# Patient Record
Sex: MALE | Race: WHITE | Employment: OTHER | URBAN - METROPOLITAN AREA
[De-identification: names, ages, dates, MRNs, and addresses within clinical notes are randomized per-mention and may not be internally consistent; named-entity substitution may affect disease eponyms.]

---

## 2019-04-15 ENCOUNTER — HOSPITAL ENCOUNTER (OUTPATIENT)
Dept: PREADMISSION TESTING | Age: 79
Discharge: HOME | End: 2019-04-15
Payer: MEDICARE

## 2019-04-15 VITALS — HEIGHT: 69 IN | WEIGHT: 158 LBS | BODY MASS INDEX: 23.4 KG/M2

## 2019-04-15 LAB
ANION GAP SERPL CALC-SCNC: 8 MMOL/L (ref 3–18)
ATRIAL RATE: 57 BPM
BUN SERPL-MCNC: 23 MG/DL (ref 7–18)
BUN/CREAT SERPL: 19 (ref 12–20)
CALCIUM SERPL-MCNC: 9.5 MG/DL (ref 8.5–10.1)
CALCULATED P AXIS, ECG09: 77 DEGREES
CALCULATED R AXIS, ECG10: 64 DEGREES
CALCULATED T AXIS, ECG11: 53 DEGREES
CHLORIDE SERPL-SCNC: 105 MMOL/L (ref 100–108)
CO2 SERPL-SCNC: 29 MMOL/L (ref 21–32)
CREAT SERPL-MCNC: 1.2 MG/DL (ref 0.6–1.3)
DIAGNOSIS, 93000: NORMAL
ERYTHROCYTE [DISTWIDTH] IN BLOOD BY AUTOMATED COUNT: 13.5 % (ref 11.6–14.5)
GLUCOSE SERPL-MCNC: 80 MG/DL (ref 74–99)
HCT VFR BLD AUTO: 42.2 % (ref 36–48)
HGB BLD-MCNC: 13.7 G/DL (ref 13–16)
MCH RBC QN AUTO: 28.8 PG (ref 24–34)
MCHC RBC AUTO-ENTMCNC: 32.5 G/DL (ref 31–37)
MCV RBC AUTO: 88.7 FL (ref 74–97)
P-R INTERVAL, ECG05: 138 MS
PLATELET # BLD AUTO: 122 K/UL (ref 135–420)
PMV BLD AUTO: 10.3 FL (ref 9.2–11.8)
POTASSIUM SERPL-SCNC: 3.9 MMOL/L (ref 3.5–5.5)
Q-T INTERVAL, ECG07: 450 MS
QRS DURATION, ECG06: 92 MS
QTC CALCULATION (BEZET), ECG08: 438 MS
RBC # BLD AUTO: 4.76 M/UL (ref 4.7–5.5)
SODIUM SERPL-SCNC: 142 MMOL/L (ref 136–145)
VENTRICULAR RATE, ECG03: 57 BPM
WBC # BLD AUTO: 7.9 K/UL (ref 4.6–13.2)

## 2019-04-15 PROCEDURE — 85027 COMPLETE CBC AUTOMATED: CPT

## 2019-04-15 PROCEDURE — 93005 ELECTROCARDIOGRAM TRACING: CPT

## 2019-04-15 PROCEDURE — 80048 BASIC METABOLIC PNL TOTAL CA: CPT

## 2019-04-15 RX ORDER — RAMIPRIL 10 MG/1
10 CAPSULE ORAL DAILY
COMMUNITY

## 2019-04-15 RX ORDER — SODIUM CHLORIDE, SODIUM LACTATE, POTASSIUM CHLORIDE, CALCIUM CHLORIDE 600; 310; 30; 20 MG/100ML; MG/100ML; MG/100ML; MG/100ML
125 INJECTION, SOLUTION INTRAVENOUS CONTINUOUS
Status: CANCELLED | OUTPATIENT
Start: 2019-04-15

## 2019-04-15 RX ORDER — ATORVASTATIN CALCIUM 40 MG/1
40 TABLET, FILM COATED ORAL
COMMUNITY

## 2019-04-15 RX ORDER — METOPROLOL TARTRATE 100 MG/1
100 TABLET ORAL 2 TIMES DAILY
COMMUNITY

## 2019-04-15 RX ORDER — CEFAZOLIN SODIUM 2 G/50ML
2 SOLUTION INTRAVENOUS ONCE
Status: CANCELLED | OUTPATIENT
Start: 2019-04-15 | End: 2019-04-15

## 2019-05-03 ENCOUNTER — HOSPITAL ENCOUNTER (OUTPATIENT)
Age: 79
Setting detail: OUTPATIENT SURGERY
Discharge: HOME | End: 2019-05-03
Attending: UROLOGY | Admitting: UROLOGY
Payer: MEDICARE

## 2019-05-03 ENCOUNTER — ANESTHESIA (OUTPATIENT)
Dept: SURGERY | Age: 79
End: 2019-05-03
Payer: MEDICARE

## 2019-05-03 ENCOUNTER — ANESTHESIA EVENT (OUTPATIENT)
Dept: SURGERY | Age: 79
End: 2019-05-03
Payer: MEDICARE

## 2019-05-03 VITALS
SYSTOLIC BLOOD PRESSURE: 144 MMHG | HEART RATE: 56 BPM | RESPIRATION RATE: 14 BRPM | TEMPERATURE: 97.4 F | DIASTOLIC BLOOD PRESSURE: 50 MMHG | HEIGHT: 69 IN | OXYGEN SATURATION: 100 % | BODY MASS INDEX: 23.26 KG/M2 | WEIGHT: 157.06 LBS

## 2019-05-03 DIAGNOSIS — N47.1 ACQUIRED PHIMOSIS: Primary | ICD-10-CM

## 2019-05-03 PROCEDURE — 76210000022 HC REC RM PH II 1.5 TO 2 HR: Performed by: UROLOGY

## 2019-05-03 PROCEDURE — 76210000063 HC OR PH I REC FIRST 0.5 HR: Performed by: UROLOGY

## 2019-05-03 PROCEDURE — 74011250636 HC RX REV CODE- 250/636

## 2019-05-03 PROCEDURE — 74011250636 HC RX REV CODE- 250/636: Performed by: UROLOGY

## 2019-05-03 PROCEDURE — 76010000149 HC OR TIME 1 TO 1.5 HR: Performed by: UROLOGY

## 2019-05-03 PROCEDURE — 77030020782 HC GWN BAIR PAWS FLX 3M -B: Performed by: UROLOGY

## 2019-05-03 PROCEDURE — 77030032490 HC SLV COMPR SCD KNE COVD -B: Performed by: UROLOGY

## 2019-05-03 PROCEDURE — 74011000250 HC RX REV CODE- 250: Performed by: UROLOGY

## 2019-05-03 PROCEDURE — 76060000033 HC ANESTHESIA 1 TO 1.5 HR: Performed by: UROLOGY

## 2019-05-03 PROCEDURE — 77030012508 HC MSK AIRWY LMA AMBU -A: Performed by: ANESTHESIOLOGY

## 2019-05-03 PROCEDURE — 77030018836 HC SOL IRR NACL ICUM -A: Performed by: UROLOGY

## 2019-05-03 PROCEDURE — 74011000250 HC RX REV CODE- 250

## 2019-05-03 PROCEDURE — 74011000272 HC RX REV CODE- 272: Performed by: UROLOGY

## 2019-05-03 PROCEDURE — 77030031139 HC SUT VCRL2 J&J -A: Performed by: UROLOGY

## 2019-05-03 RX ORDER — EPHEDRINE SULFATE 50 MG/ML
INJECTION, SOLUTION INTRAVENOUS AS NEEDED
Status: DISCONTINUED | OUTPATIENT
Start: 2019-05-03 | End: 2019-05-03 | Stop reason: HOSPADM

## 2019-05-03 RX ORDER — HYDROCODONE BITARTRATE AND ACETAMINOPHEN 5; 325 MG/1; MG/1
1 TABLET ORAL
Qty: 15 TAB | Refills: 0 | Status: SHIPPED | OUTPATIENT
Start: 2019-05-03 | End: 2019-05-10

## 2019-05-03 RX ORDER — ONDANSETRON 2 MG/ML
INJECTION INTRAMUSCULAR; INTRAVENOUS AS NEEDED
Status: DISCONTINUED | OUTPATIENT
Start: 2019-05-03 | End: 2019-05-03 | Stop reason: HOSPADM

## 2019-05-03 RX ORDER — FLUMAZENIL 0.1 MG/ML
0.2 INJECTION INTRAVENOUS
Status: DISCONTINUED | OUTPATIENT
Start: 2019-05-03 | End: 2019-05-03 | Stop reason: HOSPADM

## 2019-05-03 RX ORDER — MIDAZOLAM HYDROCHLORIDE 1 MG/ML
INJECTION, SOLUTION INTRAMUSCULAR; INTRAVENOUS AS NEEDED
Status: DISCONTINUED | OUTPATIENT
Start: 2019-05-03 | End: 2019-05-03 | Stop reason: HOSPADM

## 2019-05-03 RX ORDER — PROPOFOL 10 MG/ML
INJECTION, EMULSION INTRAVENOUS AS NEEDED
Status: DISCONTINUED | OUTPATIENT
Start: 2019-05-03 | End: 2019-05-03 | Stop reason: HOSPADM

## 2019-05-03 RX ORDER — BACITRACIN 500 [USP'U]/G
OINTMENT TOPICAL AS NEEDED
Status: DISCONTINUED | OUTPATIENT
Start: 2019-05-03 | End: 2019-05-03 | Stop reason: HOSPADM

## 2019-05-03 RX ORDER — SODIUM CHLORIDE, SODIUM LACTATE, POTASSIUM CHLORIDE, CALCIUM CHLORIDE 600; 310; 30; 20 MG/100ML; MG/100ML; MG/100ML; MG/100ML
100 INJECTION, SOLUTION INTRAVENOUS CONTINUOUS
Status: DISCONTINUED | OUTPATIENT
Start: 2019-05-03 | End: 2019-05-03 | Stop reason: HOSPADM

## 2019-05-03 RX ORDER — CEFAZOLIN SODIUM 2 G/50ML
2 SOLUTION INTRAVENOUS ONCE
Status: COMPLETED | OUTPATIENT
Start: 2019-05-03 | End: 2019-05-03

## 2019-05-03 RX ORDER — CLOTRIMAZOLE AND BETAMETHASONE DIPROPIONATE 10; .5 MG/ML; MG/ML
LOTION TOPICAL 2 TIMES DAILY
Qty: 30 ML | Refills: 0 | Status: SHIPPED | OUTPATIENT
Start: 2019-05-03

## 2019-05-03 RX ORDER — LIDOCAINE HYDROCHLORIDE 20 MG/ML
INJECTION, SOLUTION EPIDURAL; INFILTRATION; INTRACAUDAL; PERINEURAL AS NEEDED
Status: DISCONTINUED | OUTPATIENT
Start: 2019-05-03 | End: 2019-05-03 | Stop reason: HOSPADM

## 2019-05-03 RX ORDER — SODIUM CHLORIDE, SODIUM LACTATE, POTASSIUM CHLORIDE, CALCIUM CHLORIDE 600; 310; 30; 20 MG/100ML; MG/100ML; MG/100ML; MG/100ML
125 INJECTION, SOLUTION INTRAVENOUS CONTINUOUS
Status: DISCONTINUED | OUTPATIENT
Start: 2019-05-03 | End: 2019-05-03 | Stop reason: HOSPADM

## 2019-05-03 RX ORDER — NALOXONE HYDROCHLORIDE 0.4 MG/ML
0.4 INJECTION, SOLUTION INTRAMUSCULAR; INTRAVENOUS; SUBCUTANEOUS AS NEEDED
Status: DISCONTINUED | OUTPATIENT
Start: 2019-05-03 | End: 2019-05-03 | Stop reason: HOSPADM

## 2019-05-03 RX ORDER — FENTANYL CITRATE 50 UG/ML
INJECTION, SOLUTION INTRAMUSCULAR; INTRAVENOUS AS NEEDED
Status: DISCONTINUED | OUTPATIENT
Start: 2019-05-03 | End: 2019-05-03 | Stop reason: HOSPADM

## 2019-05-03 RX ORDER — ONDANSETRON 2 MG/ML
4 INJECTION INTRAMUSCULAR; INTRAVENOUS ONCE
Status: DISCONTINUED | OUTPATIENT
Start: 2019-05-03 | End: 2019-05-03 | Stop reason: HOSPADM

## 2019-05-03 RX ORDER — FENTANYL CITRATE 50 UG/ML
50 INJECTION, SOLUTION INTRAMUSCULAR; INTRAVENOUS
Status: DISCONTINUED | OUTPATIENT
Start: 2019-05-03 | End: 2019-05-03 | Stop reason: HOSPADM

## 2019-05-03 RX ORDER — HYDROCODONE BITARTRATE AND ACETAMINOPHEN 5; 325 MG/1; MG/1
1 TABLET ORAL AS NEEDED
Status: DISCONTINUED | OUTPATIENT
Start: 2019-05-03 | End: 2019-05-03 | Stop reason: HOSPADM

## 2019-05-03 RX ADMIN — LIDOCAINE HYDROCHLORIDE 80 MG: 20 INJECTION, SOLUTION EPIDURAL; INFILTRATION; INTRACAUDAL; PERINEURAL at 09:54

## 2019-05-03 RX ADMIN — FENTANYL CITRATE 25 MCG: 50 INJECTION, SOLUTION INTRAMUSCULAR; INTRAVENOUS at 10:04

## 2019-05-03 RX ADMIN — SODIUM CHLORIDE, SODIUM LACTATE, POTASSIUM CHLORIDE, AND CALCIUM CHLORIDE: 600; 310; 30; 20 INJECTION, SOLUTION INTRAVENOUS at 10:27

## 2019-05-03 RX ADMIN — FENTANYL CITRATE 25 MCG: 50 INJECTION, SOLUTION INTRAMUSCULAR; INTRAVENOUS at 10:12

## 2019-05-03 RX ADMIN — MIDAZOLAM HYDROCHLORIDE 2 MG: 1 INJECTION, SOLUTION INTRAMUSCULAR; INTRAVENOUS at 09:47

## 2019-05-03 RX ADMIN — PROPOFOL 160 MG: 10 INJECTION, EMULSION INTRAVENOUS at 09:54

## 2019-05-03 RX ADMIN — FENTANYL CITRATE 25 MCG: 50 INJECTION, SOLUTION INTRAMUSCULAR; INTRAVENOUS at 10:08

## 2019-05-03 RX ADMIN — EPHEDRINE SULFATE 10 MG: 50 INJECTION, SOLUTION INTRAVENOUS at 10:20

## 2019-05-03 RX ADMIN — ONDANSETRON 4 MG: 2 INJECTION INTRAMUSCULAR; INTRAVENOUS at 10:10

## 2019-05-03 RX ADMIN — FENTANYL CITRATE 25 MCG: 50 INJECTION, SOLUTION INTRAMUSCULAR; INTRAVENOUS at 10:00

## 2019-05-03 RX ADMIN — SODIUM CHLORIDE, SODIUM LACTATE, POTASSIUM CHLORIDE, AND CALCIUM CHLORIDE 125 ML/HR: 600; 310; 30; 20 INJECTION, SOLUTION INTRAVENOUS at 12:48

## 2019-05-03 RX ADMIN — CEFAZOLIN SODIUM 2 G: 2 SOLUTION INTRAVENOUS at 09:58

## 2019-05-03 RX ADMIN — SODIUM CHLORIDE, SODIUM LACTATE, POTASSIUM CHLORIDE, AND CALCIUM CHLORIDE 125 ML/HR: 600; 310; 30; 20 INJECTION, SOLUTION INTRAVENOUS at 08:16

## 2019-05-03 NOTE — BRIEF OP NOTE
Date of Procedure: 5/3/2019 Preoperative Diagnosis: PHIMOSIS Postoperative Diagnosis: PHIMOSIS Procedure(s): CIRCUMCISION, \"SPEC POP\" Surgeon(s) and Role: 
   * Mandeep Holt MD - Primary Surgical Assistant: Caroline Santos Surgical Staff: 
Circ-1: Aquiles Nieto Circ-Relief: Preet Wallace RN Scrub Tech-1: Leo Key Scrub Tech-2: Ba Suresh Float Staff: Caroline Santos Event Time In Time Out Incision Start [id number] Incision Close 1041 Anesthesia: General  
Estimated Blood Loss: minimal 
Specimens: * No specimens in log * Findings: very adherent phimosis with near obliteration of the corona sulcus. Complications: None Implants: * No implants in log * PROCEDURE NOTE: 
Patient was wheeled into the operating theatre. Time-out was performed. Anesthesia was then administered without incident. Preoperative IV antibiotics were administered. Dorsal penile block was performed with 0.5% marcaine. Significant phimosis unable to retract the foreskin. The open at the foreskin was crushed with hemostat and cut with mets. The glans was then delivered out with findings as noted above. The foreskin was then marked proximally (at level of coronal sulcus) and distally (at mucosal skirt) and a #15-blade was then used to make a circumferential incision. A curved hemostat was used to dissect a passage deep to the skin to allow us to transect the skin from prox to distal with electrocautery. This creates a flap with 2 edges. Then 4 snaps are fastened, one to each edge. Then the skin was sharply dissected off of the underlying tissue using Bovie cautery. Attention was then turned to any bleeding with excellent hemostasis achieved using the Bovie. Using 3-0 vicryl, a U-stitch at the frenulum was used to align the skin edges. The dorsal edge was approximated with 3-0 vicryl. The circumcision line was approximated with 3-0 running suture in a total of 2 quadrants. This completed the procedure. Bacitracin was used as a dressing. The patient was then reversed from anesthesia and transferred to the PACU in stable condition.

## 2019-05-03 NOTE — DISCHARGE INSTRUCTIONS
[address] [facility], Urology     [facility], [address], [address]  Office: [phone]  Fax:    [phone]    PROCEDURE     Circumcision  __  [facility]rology IMMEDIATELY if any of the following occur:      Temperature above 101.5° and / or chills.   You are nauseous and / or vomiting and you cannot hold down any fluids.   Your pain is not controlled with the pain medication prescribed. Special Considerations:        _x_  No heavy lifting over 10 pounds & no strenuous exercise for 1 week.  _x_  Other instructions:  Ok to shower in 24 hours but no soaking in bath tube or swimming for 4 weeks to allow wound to heal. Apply the betamethasone cream twice a day for 2 weeks to the head of the penis.  _x_  Our office will call you to make an appointment in 3-4 weeks. Please contact [place] [address] [facility] at [phone] or go to the nearest Emergency Department / Urgent Care facility for any other medical questions or concerns. Patient armband removed and shredded  DISCHARGE SUMMARY from Nurse    PATIENT INSTRUCTIONS:    After general anesthesia or intravenous sedation, for 24 hours or while taking prescription Narcotics:  · Limit your activities  · Do not drive and operate hazardous machinery  · Do not make important personal or business decisions  · Do  not drink alcoholic beverages  · If you have not urinated within 8 hours after discharge, please contact your surgeon on call.     Report the following to your surgeon:  · Excessive pain, swelling, redness or odor of or around the surgical area  · Temperature over 100.5  · Nausea and vomiting lasting longer than 4 hours or if unable to take medications  · Any signs of decreased circulation or nerve impairment to extremity: change in color, persistent  numbness, tingling, coldness or increase pain  · Any questions    What to do at Home:  Recommended activity: Activity as tolerated, Activity as tolerated and no driving for today and Ambulate in house,     If you experience any of the following symptoms elevated temp, pain not relieved by mediations prescribed for you, unable to urinate, please follow up with Dr. Jose Sherman. *  Please give a list of your current medications to your Primary Care Provider. *  Please update this list whenever your medications are discontinued, doses are      changed, or new medications (including over-the-counter products) are added. *  Please carry medication information at all times in case of emergency situations. These are general instructions for a healthy lifestyle:    No smoking/ No tobacco products/ Avoid exposure to second hand smoke  Surgeon General's Warning:  Quitting smoking now greatly reduces serious risk to your health. Obesity, smoking, and sedentary lifestyle greatly increases your risk for illness    A healthy diet, regular physical exercise & weight monitoring are important for maintaining a healthy lifestyle    You may be retaining fluid if you have a history of heart failure or if you experience any of the following symptoms:  Weight gain of 3 pounds or more overnight or 5 pounds in a week, increased swelling in our hands or feet or shortness of breath while lying flat in bed. Please call your doctor as soon as you notice any of these symptoms; do not wait until your next office visit. Recognize signs and symptoms of STROKE:    F-face looks uneven    A-arms unable to move or move unevenly    S-speech slurred or non-existent    T-time-call 911 as soon as signs and symptoms begin-DO NOT go       Back to bed or wait to see if you get better-TIME IS BRAIN. Warning Signs of HEART ATTACK     Call 911 if you have these symptoms:   Chest discomfort. Most heart attacks involve discomfort in the center of the chest that lasts more than a few minutes, or that goes away and comes back.  It can feel like uncomfortable pressure, squeezing, fullness, or pain.  Discomfort in other areas of the upper body. Symptoms can include pain or discomfort in one or both arms, the back, neck, jaw, or stomach.  Shortness of breath with or without chest discomfort.  Other signs may include breaking out in a cold sweat, nausea, or lightheadedness. Don't wait more than five minutes to call 911 - MINUTES MATTER! Fast action can save your life. Calling 911 is almost always the fastest way to get lifesaving treatment. Emergency Medical Services staff can begin treatment when they arrive -- up to an hour sooner than if someone gets to the hospital by car. The discharge information has been reviewed with the patient and caregiver. The patient and caregiver verbalized understanding. Discharge medications reviewed with the {[employer] meds reviewed [id number]} and appropriate educational materials and side effects teaching were provided.   ___________________________________________________________________________________________________________________________________

## 2019-05-03 NOTE — ANESTHESIA POSTPROCEDURE EVALUATION
Post-Anesthesia Evaluation & Assessment Visit Vitals /49 Pulse 61 Temp 36.3 °C (97.4 °F) Resp 14 Ht 5' 9\" (1.753 m) Wt 71.2 kg (157 lb 1 oz) SpO2 99% BMI 23.19 kg/m² Nausea/Vomiting: Controlled. Post-operative hydration adequate. Pain Scale 1: Numeric (0 - 10) (05/03/19 1119) Pain Intensity 1: 0 (05/03/19 1119) Managed Pain score at or below stated goal level. Mental status & Level of consciousness: alert and oriented x 3 Neurological status: moves all extremities, sensation grossly intact Pulmonary status: airway patent, adequate oxygenation. Complications related to anesthesia: none Patient has met all PACU discharge requirements.  
 
 
Rosas Cortez DO

## 2019-05-03 NOTE — OP NOTES
Date of Procedure: 5/3/2019   Preoperative Diagnosis: PHIMOSIS  Postoperative Diagnosis: PHIMOSIS    Procedure(s):  CIRCUMCISION, \"SPEC POP\"  Surgeon(s) and Role:     * Claudette Littles, MD - Primary         Surgical Assistant: Colleen Lara     Surgical Staff:  Circ-1: Zaheer Cater: Marisela Rain RN  Scrub Tech-1: Albert Du  Scrub Tech-2: Karuna Tran, [address] Staff: Colleen Lara  Event Time In Time Out   Incision Start 1007     Incision Close 1041        Anesthesia: General   Estimated Blood Loss: minimal  Specimens: * No specimens in log *   Findings: very adherent phimosis with near obliteration of the corona sulcus. Complications: None  Implants: * No implants in log *        PROCEDURE NOTE:  Patient was wheeled into the operating theatre. Time-out was performed. Anesthesia was then administered without incident. Preoperative IV antibiotics were administered. Dorsal penile block was performed with 0.5% marcaine. Significant phimosis unable to retract the foreskin. The open at the foreskin was crushed with hemostat and cut with mets. The glans was then delivered out with findings as noted above. The foreskin was then marked proximally (at level of coronal sulcus) and distally (at mucosal skirt) and a #15-blade was then used to make a circumferential incision. A curved hemostat was used to dissect a passage deep to the skin to allow us to transect the skin from prox to distal with electrocautery. This creates a flap with 2 edges. Then 4 snaps are fastened, one to each edge. Then the skin was sharply dissected off of the underlying tissue using Bovie cautery. Attention was then turned to any bleeding with excellent hemostasis achieved using the Bovie. Using 3-0 vicryl, a U-stitch at the frenulum was used to align the skin edges. The dorsal edge was approximated with 3-0 vicryl. The circumcision line was approximated with 3-0 running suture in a total of 2 quadrants.   This completed the procedure. Bacitracin was used as a dressing. The patient was then reversed from anesthesia and transferred to the PACU in stable condition.

## 2019-05-03 NOTE — H&P
History and Physical 
 
Patient: Natalie Cannon MRN: [id number]  SSN: [id number] YOB: 1940  Age: 77 y.o. Sex: male Subjective:  
  
Natalie Cannon is a 77 y.o. male who 
Penile concerns The patient is being evaluated for Penile concerns. The concerns began gradually and last for years. The problem is perceived as severe and worsened. The uncircumcised patient is noted to have phimosis. He is noted to have phimosis. . The patient reports no history of diabetes, compromised immune system or prior urethral catheterizations. Pertinent negatives include fever, penile discharge, penile infections, penile inflammation, penile pain, penile swelling, swelling around urethra, urinary dribbling, urinary frequency, urinary hesitancy, urinary straining and urinary urgency. Additional information: Pt with pain at the area of the phimosis intermittently. All lab results for the last 24 hours reviewed. Past Medical History:  
Diagnosis Date  Adverse effect of anesthesia 2003  
 cardiac arrest  
 High cholesterol  Hypertension  OA (osteoarthritis) Past Surgical History:  
Procedure Laterality Date  CABG, ARTERY-VEIN, FOUR    
 HX AORTIC VALVULOPLASTY  HX CATARACT REMOVAL Bilateral   
 HX GI    
 colonoscopy  HX KNEE REPLACEMENT Right 2003  HX LAP CHOLECYSTECTOMY History reviewed. No pertinent family history. Social History Tobacco Use  Smoking status: Never Smoker  Smokeless tobacco: Never Used Substance Use Topics  Alcohol use: Never Frequency: Never Prior to Admission medications Medication Sig Start Date End Date Taking? Authorizing Provider  
metoprolol tartrate (LOPRESSOR) 100 mg IR tablet Take 100 mg by mouth two (2) times a day. Indications: high blood pressure    Provider, Historical  
rivaroxaban (XARELTO) 20 mg tab tablet Take  by mouth daily.  Indications: Treatment to Prevent Blood Clots in Chronic Atrial Fibrillation    Provider, Historical  
atorvastatin (LIPITOR) 40 mg tablet Take 40 mg by mouth nightly. Indications: high cholesterol    Provider, Historical  
ramipril (ALTACE) 10 mg capsule Take 10 mg by mouth daily. Indications: high blood pressure    Provider, Historical  
  
 
No Known Allergies Review of Systems: A comprehensive review of systems was negative except for that written in the History of Present Illness. Objective:  
 
Vitals:  
 05/03/19 0743 BP: 151/51 Pulse: (!) 52 Resp: 16 Temp: 97.1 °F (36.2 °C) SpO2: 100% Weight: 71.2 kg (157 lb 1 oz) Height: 5' 9\" (1.753 m) Physical Exam: 
 
Physical Exam 
Exam Findings Details Male Quick Exam Comments severe phimosis but able to urinate. Constitutional Normal Well developed. Head/Face Normal Facial features - Normal.  
Nose/Mouth/Throat Normal Tongue - Normal. Buccal mucosa - Normal.  
Lymph Detail Normal Submandibular. Supraclavicular. Respiratory Normal Inspection - Normal. Effort - Normal.  
Abdomen Normal No abdominal tenderness. No hepatic enlargement. No hepatic enlargement. No spleen enlargement. No hernia. Genitourinary * Penis - uncircumcised, phimosis. Genitourinary Normal Urethral meatus - Normal. Scrotum - Normal. Epididymides - Normal. Lymph nodes - Normal. Testes - Normal. No CVA tenderness. No suprapubic tenderness. Musculoskeletal Normal Visual overview of all four extremities is normal. Gait - Normal.  
Extremity Normal No edema. No Calf tenderness. Neurological Normal Level of consciousness - Normal. Orientation - Normal. Memory - Normal.  
Psychiatric Normal Orientation - Oriented to time, place, person & situation. Appropriate mood and affect. Assessment:  
 
  
Assessment Acquired phimosis (N47.1). Patient Plan Elective circ. Need cardiac clearance from cards Dr. Antonio Ferreira.  Pt is on xeralto for aortic valve replacement and cardiac bypass history. Discussed the r/b/a in detail. He will undergo PAT per hospital protocol. High risk patient for low risk procedure. Signed By: Maureen Hernandez MD   
 May 3, 2019

## 2019-05-03 NOTE — PERIOP NOTES
TRANSFER - IN REPORT: 
 
Verbal report received from Dr Janet Omer , [address] [place] [address] nurse 
(name) on John Lowry  being received from OR (unit) for routine progression of care Report consisted of patients Situation, Background, Assessment and  
Recommendations(SBAR). Information from the following report(s) OR Summary, Procedure Summary, Intake/Output and MAR was reviewed with the receiving nurse. Opportunity for questions and clarification was provided. Assessment completed upon patients arrival to unit and care assumed.

## 2019-05-03 NOTE — ANESTHESIA PREPROCEDURE EVALUATION
Relevant Problems No relevant active problems Anesthetic History No history of anesthetic complications Review of Systems / Medical History Patient summary reviewed, nursing notes reviewed and pertinent labs reviewed Pulmonary Within defined limits Neuro/Psych Within defined limits Cardiovascular Hypertension: well controlled Dysrhythmias : atrial fibrillation CAD and CABG Pertinent negatives: No past MI, angina and CHF Exercise tolerance: >4 METS Comments: Paroxysmal afib GI/Hepatic/Renal 
  
GERD: well controlled Pertinent negatives: No hepatitis, liver disease and renal disease Comments: Food specific GERD Endo/Other Arthritis Pertinent negatives: No diabetes, hypothyroidism, hyperthyroidism and obesity Other Findings Physical Exam 
 
Airway Mallampati: I 
TM Distance: 4 - 6 cm Neck ROM: decreased range of motion Mouth opening: Normal 
 
 Cardiovascular Regular rate and rhythm,  S1 and S2 normal,  no murmur, click, rub, or gallop Dental 
 
Dentition: Edentulous Pulmonary Breath sounds clear to auscultation Abdominal 
GI exam deferred Other Findings Anesthetic Plan ASA: 3 Anesthesia type: general 
 
 
 
 
Induction: Intravenous Anesthetic plan and risks discussed with: Patient GA/LMA

## 2023-08-02 NOTE — PERIOP NOTES
No platelet repeat needed per Dr Loco Fernandez. Spoke to pt to schedule procedure(s) Colonoscopy       Physician to perform procedure(s) Dr. ANDRES Moody  Date of Procedure (s) 10/27/2023  Arrival Time 08:20 AM  Time of Procedure(s) 09:20 AM   Location of Procedure(s) [facility]  Type of Rx Prep sent to patient: Suprep  Instructions provided to patient via [facility]    Patient was informed on the following information and verbalized understanding. Screening questionnaire reviewed with patient and complete. If procedure requires anesthesia, a responsible adult needs to be present to accompany the patient home, patient cannot drive after receiving anesthesia. Appointment details are tentative, especially check-in time. Patient will receive a prep-op call 4 days prior to confirm check-in time for procedure. If applicable the patient should contact their pharmacy to verify Rx for procedure prep is ready for pick-up. Patient was advised to call the scheduling department at [phone] if pharmacy states no Rx is available. Patient was advised to call the endoscopy scheduling department if any questions or concerns arise.      [facility] Endoscopy Scheduling Department         Procedure: Ambulatory referral/consult to Endo Procedure  Status: Needs Scheduling (Sent to Patient)     Requested appt date: 3/7/2023 Authorizing: Dot Mcleod MD in [facility] INTERNAL MEDICINE     Referral: [id number] (Authorized)         Expires: 9/6/2023 Priority: Routine     Assign to: KAREN CASTILLO       Diagnosis: Screening for malignant neoplasm of colon [Z12.11]      Order Specific Questions     What procedure is to be performed?     Screening Colonoscopy         CPT Code:     COLON CA SCRN NOT HI RSK IND []

## 2023-11-19 NOTE — PERIOP NOTES
No sleep apnea or family history of malignant hyperthermia. Removable dentures. Care fusion kit and instructions given and reviewed. PCP is aware of the surgery. No participation in clinical trial or research study. Meets criteria for special population- A fib. Posting notified. Pt is on Xarelto. Has not stopped and does not have an appt with Dr. Masha Mujica. [facility] and that pt wants her to call him. Hyponatremia

## (undated) DEVICE — REM POLYHESIVE ADULT PATIENT RETURN ELECTRODE: Brand: VALLEYLAB

## (undated) DEVICE — STRETCH BANDAGE: Brand: CURITY

## (undated) DEVICE — KENDALL SCD EXPRESS SLEEVES, KNEE LENGTH, MEDIUM: Brand: KENDALL SCD

## (undated) DEVICE — STERILE LATEX POWDER-FREE SURGICAL GLOVESWITH NITRILE COATING: Brand: PROTEXIS

## (undated) DEVICE — STRAP,POSITIONING,KNEE/BODY,FOAM,4X60": Brand: MEDLINE

## (undated) DEVICE — UTILITY MARKER,BLACK WITH LABELS: Brand: DEVON

## (undated) DEVICE — MINOR: Brand: MEDLINE INDUSTRIES, INC.

## (undated) DEVICE — INTENDED FOR TISSUE SEPARATION, AND OTHER PROCEDURES THAT REQUIRE A SHARP SURGICAL BLADE TO PUNCTURE OR CUT.: Brand: BARD-PARKER ® CARBON RIB-BACK BLADES

## (undated) DEVICE — 3-0 COATED VICRYL PLUS UNDYED 1X27" SH --

## (undated) DEVICE — TRAY PREP DRY W/ PREM GLV 2 APPL 6 SPNG 2 UNDPD 1 OVERWRAP

## (undated) DEVICE — GOWN,AURORA,NONRNF,XL,30/CS: Brand: MEDLINE

## (undated) DEVICE — SOL IRRIGATION INJ NACL 0.9% 500ML BTL